# Patient Record
Sex: FEMALE | Race: WHITE | NOT HISPANIC OR LATINO | URBAN - METROPOLITAN AREA
[De-identification: names, ages, dates, MRNs, and addresses within clinical notes are randomized per-mention and may not be internally consistent; named-entity substitution may affect disease eponyms.]

---

## 2021-02-09 ENCOUNTER — OFFICE (OUTPATIENT)
Dept: URBAN - METROPOLITAN AREA CLINIC 106 | Facility: CLINIC | Age: 86
End: 2021-02-09
Payer: COMMERCIAL

## 2021-02-09 VITALS
HEART RATE: 76 BPM | DIASTOLIC BLOOD PRESSURE: 70 MMHG | TEMPERATURE: 96.8 F | SYSTOLIC BLOOD PRESSURE: 130 MMHG | HEIGHT: 63 IN | WEIGHT: 130 LBS

## 2021-02-09 DIAGNOSIS — E78.00 PURE HYPERCHOLESTEROLEMIA, UNSPECIFIED: ICD-10-CM

## 2021-02-09 DIAGNOSIS — K58.2 MIXED IRRITABLE BOWEL SYNDROME: ICD-10-CM

## 2021-02-09 DIAGNOSIS — I10 ESSENTIAL (PRIMARY) HYPERTENSION: ICD-10-CM

## 2021-02-09 DIAGNOSIS — K52.831 COLLAGENOUS COLITIS: ICD-10-CM

## 2021-02-09 PROCEDURE — 99214 OFFICE O/P EST MOD 30 MIN: CPT

## 2021-02-09 RX ORDER — COLESTIPOL HYDROCHLORIDE 1 G/1
TABLET, FILM COATED ORAL
Qty: 180 | Refills: 0 | Status: ACTIVE

## 2021-02-09 NOTE — SERVICEHPINOTES
Yesika Rubio   is seen today for a follow-up visit.     She presents today for a follow up and refill of Colestipol. She states she has been taking it for quite a while and doing well on it. She stopped it approx. 6 months ago and is having loose stools. She denies GI tract bleeding, abdominal discomfort or unintentional weight loss.

## 2024-06-27 ENCOUNTER — OFFICE (OUTPATIENT)
Dept: URBAN - METROPOLITAN AREA CLINIC 67 | Facility: CLINIC | Age: 89
End: 2024-06-27
Payer: COMMERCIAL

## 2024-06-27 VITALS — HEIGHT: 63 IN

## 2024-06-27 DIAGNOSIS — R14.0 ABDOMINAL DISTENSION (GASEOUS): ICD-10-CM

## 2024-06-27 DIAGNOSIS — R63.4 ABNORMAL WEIGHT LOSS: ICD-10-CM

## 2024-06-27 DIAGNOSIS — R19.4 CHANGE IN BOWEL HABIT: ICD-10-CM

## 2024-06-27 DIAGNOSIS — R13.12 DYSPHAGIA, OROPHARYNGEAL PHASE: ICD-10-CM

## 2024-06-27 DIAGNOSIS — K52.831 COLLAGENOUS COLITIS: ICD-10-CM

## 2024-06-27 PROCEDURE — 99214 OFFICE O/P EST MOD 30 MIN: CPT | Performed by: INTERNAL MEDICINE

## 2024-06-27 NOTE — SERVICEHPINOTES
Yesika Rubio   is seen today for a follow-up visit.    
br   89 yo F establishing care for multiple abdominal complaints.
br History provided is incomplete due to lack of health literacy, daughter-in-law in office today to supplement.
br Essentially has been experiencing weight loss since Feb 2024, roughly 25 lbs, but also this was in the setting of post-knee surgery recovery and decreased oral intake.
br 
Has a history of collagenous colitis diagnosed in 2009 and was placed on entocort, repeat biopsies in 2012 showed no collagenous colitis. Has altered bowel habits but uncertain by her description on how often she has a BM, she describes stools as loose but can have one a day or several times a day. Has post prandial bloating as well.br Also complains of dysphagia symptoms to pills, not to food or liquids. 
ben
brReviewed recent records from ER visit 6/21, during which she complained of abdominal pain and diarrhea, CTAP w contrast was unremarkable as was CMP, CBC, and lipase.
br

brPHYSICAL EXAM - General: No acute distress. Awake and conversant.- Eyes: Normal conjunctiva. No icterus.- ENT: Hearing grossly intact. No nasal discharge.- Neck: Neck is supple. No asymmetry or goiter.br- CV: No lower extremity edema. - Respiratory: Respirations are non-labored. No audible wheezing.br - Abdomen: No abdominal guarding, No abdominal distention.- Skin: No jaundice. No rashes or ulcers.- Psych:  Cooperative, Appropriate mood and affect.- MSK: Normal ambulation. No upper extremity clubbing or cyanosis.- Neuro: CN II-XII grossly normal. br ben

## 2024-06-27 NOTE — SERVICENOTES
Try starting with fiber supplementation as instructed for at least two weeks. If no improvement, can stop fiber and move to Lactose Free Diet as instructed. If only partial improvement with fiber, can continue fiber and proceed with Lactose Free Diet for two weeks. If no improvement with Lactose Free Diet, can resume lactose and trial Gluten Free Diet for two weeks.